# Patient Record
Sex: FEMALE | Race: WHITE | NOT HISPANIC OR LATINO
[De-identification: names, ages, dates, MRNs, and addresses within clinical notes are randomized per-mention and may not be internally consistent; named-entity substitution may affect disease eponyms.]

---

## 2020-03-06 ENCOUNTER — APPOINTMENT (OUTPATIENT)
Dept: OBGYN | Facility: CLINIC | Age: 25
End: 2020-03-06
Payer: MEDICAID

## 2020-03-06 PROCEDURE — 58301 REMOVE INTRAUTERINE DEVICE: CPT

## 2020-05-20 PROBLEM — Z00.00 ENCOUNTER FOR PREVENTIVE HEALTH EXAMINATION: Status: ACTIVE | Noted: 2020-05-20

## 2020-06-29 ENCOUNTER — APPOINTMENT (OUTPATIENT)
Dept: OBGYN | Facility: CLINIC | Age: 25
End: 2020-06-29
Payer: MEDICAID

## 2020-06-29 PROCEDURE — 99385 PREV VISIT NEW AGE 18-39: CPT

## 2020-06-29 PROCEDURE — 36415 COLL VENOUS BLD VENIPUNCTURE: CPT

## 2020-06-29 PROCEDURE — 81025 URINE PREGNANCY TEST: CPT

## 2020-07-13 ENCOUNTER — APPOINTMENT (OUTPATIENT)
Dept: OBGYN | Facility: CLINIC | Age: 25
End: 2020-07-13

## 2020-07-13 ENCOUNTER — APPOINTMENT (OUTPATIENT)
Dept: ANTEPARTUM | Facility: CLINIC | Age: 25
End: 2020-07-13
Payer: MEDICAID

## 2020-07-13 PROCEDURE — 76801 OB US < 14 WKS SINGLE FETUS: CPT

## 2020-08-10 ENCOUNTER — APPOINTMENT (OUTPATIENT)
Dept: OBGYN | Facility: CLINIC | Age: 25
End: 2020-08-10
Payer: MEDICAID

## 2020-08-10 ENCOUNTER — APPOINTMENT (OUTPATIENT)
Dept: ANTEPARTUM | Facility: CLINIC | Age: 25
End: 2020-08-10
Payer: MEDICAID

## 2020-08-10 PROCEDURE — 0502F SUBSEQUENT PRENATAL CARE: CPT

## 2020-08-10 PROCEDURE — 76813 OB US NUCHAL MEAS 1 GEST: CPT

## 2020-09-04 ENCOUNTER — APPOINTMENT (OUTPATIENT)
Dept: OBGYN | Facility: CLINIC | Age: 25
End: 2020-09-04
Payer: MEDICAID

## 2020-09-04 PROCEDURE — 0502F SUBSEQUENT PRENATAL CARE: CPT

## 2020-10-06 ENCOUNTER — APPOINTMENT (OUTPATIENT)
Dept: ANTEPARTUM | Facility: CLINIC | Age: 25
End: 2020-10-06
Payer: MEDICAID

## 2020-10-06 PROCEDURE — 76817 TRANSVAGINAL US OBSTETRIC: CPT

## 2020-10-06 PROCEDURE — 76811 OB US DETAILED SNGL FETUS: CPT

## 2020-11-02 ENCOUNTER — APPOINTMENT (OUTPATIENT)
Dept: OBGYN | Facility: CLINIC | Age: 25
End: 2020-11-02
Payer: MEDICAID

## 2020-11-02 PROCEDURE — 0502F SUBSEQUENT PRENATAL CARE: CPT

## 2020-11-30 ENCOUNTER — APPOINTMENT (OUTPATIENT)
Dept: OBGYN | Facility: CLINIC | Age: 25
End: 2020-11-30
Payer: MEDICAID

## 2020-11-30 PROCEDURE — 0502F SUBSEQUENT PRENATAL CARE: CPT

## 2020-12-09 ENCOUNTER — OUTPATIENT (OUTPATIENT)
Dept: INPATIENT UNIT | Facility: HOSPITAL | Age: 25
LOS: 1 days | Discharge: HOME | End: 2020-12-09
Payer: MEDICAID

## 2020-12-09 VITALS — TEMPERATURE: 98 F

## 2020-12-09 VITALS
TEMPERATURE: 98 F | DIASTOLIC BLOOD PRESSURE: 61 MMHG | SYSTOLIC BLOOD PRESSURE: 96 MMHG | HEART RATE: 82 BPM | RESPIRATION RATE: 16 BRPM

## 2020-12-09 DIAGNOSIS — Z3A.00 WEEKS OF GESTATION OF PREGNANCY NOT SPECIFIED: ICD-10-CM

## 2020-12-09 DIAGNOSIS — O26.899 OTHER SPECIFIED PREGNANCY RELATED CONDITIONS, UNSPECIFIED TRIMESTER: ICD-10-CM

## 2020-12-09 PROCEDURE — 76818 FETAL BIOPHYS PROFILE W/NST: CPT | Mod: 26

## 2020-12-09 PROCEDURE — 99215 OFFICE O/P EST HI 40 MIN: CPT | Mod: 25

## 2020-12-09 NOTE — OB PROVIDER TRIAGE NOTE - NSHPPHYSICALEXAM_GEN_ALL_CORE
Assessment reveals VSS, abdomen soft, NT, gravid.  Placed on EFM and toco at 1522 and removed at 1554- FHT not archived. NST performed in quick look.  Cat 1 FHT, no ctx on toco  Patient reports feeling GFM at this time.     Taken to TR 1 to complete evaluation and for BPP Assessment reveals VSS, abdomen soft, NT, gravid.  Placed on EFM and toco at 1522 and removed at 1554- FHT not archived. NST performed in quick look.  Cat 1 FHT, no ctx on toco  Patient reports feeling GFM at this time.     Taken to TR 1 to complete evaluation and for BPP  ----------------  1602:  TAS-anterior placenta; KARLA-24.2; 8/8 BPP; shira breech presentation Assessment reveals abdomen soft, NT, gravid.  Placed on EFM and toco at 1522 and removed at 1554- FHT not archived. NST performed in quick look.  Cat 1 FHT, no ctx on toco  Patient reports feeling GFM at this time.     Taken to TR 1 to complete evaluation and for BPP  ----------------  1602:  TAS-anterior placenta; KARLA-24.2; 8/8 BPP; shira breech presentation

## 2020-12-09 NOTE — OB PROVIDER TRIAGE NOTE - NSOBPROVIDERNOTE_OBGYN_ALL_OB_FT
26yo  female  @ 29.2 wks SLIUP uncomp PNC here co dec FM since last night  -NST is cat I with accels  -pt reports +FM at this time  -TAS is 8/8 BPP  -pt was dc Dr Gallegos and pt was dc home with instructions to keep her scheduled appt for Monday 12/14

## 2020-12-09 NOTE — OB PROVIDER TRIAGE NOTE - HISTORY OF PRESENT ILLNESS
24yo  @ 29.2 presents with c/o decreased FM since last night. Denies LOF, VB and contractions.  Reports being sick in march, was never covid tested then had positive antibodies.    Denies PMH/PSH    OB H/O 2016 FT  8-6    AP course uncomplicated

## 2020-12-14 ENCOUNTER — TRANSCRIPTION ENCOUNTER (OUTPATIENT)
Age: 25
End: 2020-12-14

## 2020-12-14 ENCOUNTER — APPOINTMENT (OUTPATIENT)
Dept: ANTEPARTUM | Facility: CLINIC | Age: 25
End: 2020-12-14
Payer: MEDICAID

## 2020-12-14 ENCOUNTER — APPOINTMENT (OUTPATIENT)
Dept: OBGYN | Facility: CLINIC | Age: 25
End: 2020-12-14
Payer: MEDICAID

## 2020-12-14 PROCEDURE — 0502F SUBSEQUENT PRENATAL CARE: CPT

## 2020-12-14 PROCEDURE — 99072 ADDL SUPL MATRL&STAF TM PHE: CPT

## 2020-12-14 PROCEDURE — 76819 FETAL BIOPHYS PROFIL W/O NST: CPT

## 2020-12-14 PROCEDURE — 76816 OB US FOLLOW-UP PER FETUS: CPT

## 2020-12-29 ENCOUNTER — APPOINTMENT (OUTPATIENT)
Dept: OBGYN | Facility: CLINIC | Age: 25
End: 2020-12-29
Payer: MEDICAID

## 2020-12-29 PROCEDURE — 0502F SUBSEQUENT PRENATAL CARE: CPT

## 2021-01-11 ENCOUNTER — APPOINTMENT (OUTPATIENT)
Dept: OBGYN | Facility: CLINIC | Age: 26
End: 2021-01-11
Payer: MEDICAID

## 2021-01-11 PROCEDURE — 0502F SUBSEQUENT PRENATAL CARE: CPT

## 2021-01-25 ENCOUNTER — APPOINTMENT (OUTPATIENT)
Dept: OBGYN | Facility: CLINIC | Age: 26
End: 2021-01-25
Payer: MEDICAID

## 2021-01-25 PROCEDURE — 0502F SUBSEQUENT PRENATAL CARE: CPT

## 2021-02-01 ENCOUNTER — APPOINTMENT (OUTPATIENT)
Dept: OBGYN | Facility: CLINIC | Age: 26
End: 2021-02-01

## 2021-02-03 ENCOUNTER — APPOINTMENT (OUTPATIENT)
Dept: OBGYN | Facility: CLINIC | Age: 26
End: 2021-02-03
Payer: MEDICAID

## 2021-02-03 PROCEDURE — 0502F SUBSEQUENT PRENATAL CARE: CPT

## 2021-02-09 ENCOUNTER — APPOINTMENT (OUTPATIENT)
Dept: ANTEPARTUM | Facility: CLINIC | Age: 26
End: 2021-02-09
Payer: MEDICAID

## 2021-02-09 PROCEDURE — 76816 OB US FOLLOW-UP PER FETUS: CPT

## 2021-02-09 PROCEDURE — 99072 ADDL SUPL MATRL&STAF TM PHE: CPT

## 2021-02-09 PROCEDURE — 76819 FETAL BIOPHYS PROFIL W/O NST: CPT

## 2021-02-17 ENCOUNTER — APPOINTMENT (OUTPATIENT)
Dept: OBGYN | Facility: CLINIC | Age: 26
End: 2021-02-17
Payer: MEDICAID

## 2021-02-17 PROCEDURE — 0502F SUBSEQUENT PRENATAL CARE: CPT

## 2021-02-22 ENCOUNTER — APPOINTMENT (OUTPATIENT)
Dept: ANTEPARTUM | Facility: CLINIC | Age: 26
End: 2021-02-22
Payer: MEDICAID

## 2021-02-22 PROCEDURE — 76816 OB US FOLLOW-UP PER FETUS: CPT

## 2021-02-22 PROCEDURE — 99072 ADDL SUPL MATRL&STAF TM PHE: CPT

## 2021-02-22 PROCEDURE — 76818 FETAL BIOPHYS PROFILE W/NST: CPT

## 2021-02-25 ENCOUNTER — APPOINTMENT (OUTPATIENT)
Dept: ANTEPARTUM | Facility: CLINIC | Age: 26
End: 2021-02-25
Payer: MEDICAID

## 2021-02-25 PROCEDURE — 0502F SUBSEQUENT PRENATAL CARE: CPT

## 2021-02-26 DIAGNOSIS — Z01.818 ENCOUNTER FOR OTHER PREPROCEDURAL EXAMINATION: ICD-10-CM

## 2021-02-27 ENCOUNTER — APPOINTMENT (OUTPATIENT)
Dept: DISASTER EMERGENCY | Facility: CLINIC | Age: 26
End: 2021-02-27

## 2021-02-28 ENCOUNTER — TRANSCRIPTION ENCOUNTER (OUTPATIENT)
Age: 26
End: 2021-02-28

## 2021-02-28 ENCOUNTER — INPATIENT (INPATIENT)
Facility: HOSPITAL | Age: 26
LOS: 1 days | Discharge: HOME | End: 2021-03-02
Attending: OBSTETRICS & GYNECOLOGY | Admitting: OBSTETRICS & GYNECOLOGY
Payer: MEDICAID

## 2021-02-28 VITALS — SYSTOLIC BLOOD PRESSURE: 124 MMHG | DIASTOLIC BLOOD PRESSURE: 75 MMHG | HEART RATE: 141 BPM

## 2021-02-28 DIAGNOSIS — O26.899 OTHER SPECIFIED PREGNANCY RELATED CONDITIONS, UNSPECIFIED TRIMESTER: ICD-10-CM

## 2021-02-28 DIAGNOSIS — Z3A.00 WEEKS OF GESTATION OF PREGNANCY NOT SPECIFIED: ICD-10-CM

## 2021-02-28 LAB
BASOPHILS # BLD AUTO: 0.04 K/UL — SIGNIFICANT CHANGE UP (ref 0–0.2)
BASOPHILS NFR BLD AUTO: 0.2 % — SIGNIFICANT CHANGE UP (ref 0–2)
EOSINOPHIL # BLD AUTO: 0.05 K/UL — SIGNIFICANT CHANGE UP (ref 0–0.5)
EOSINOPHIL NFR BLD AUTO: 0.3 % — SIGNIFICANT CHANGE UP (ref 0–6)
HCT VFR BLD CALC: 37.3 % — SIGNIFICANT CHANGE UP (ref 34.5–45)
HGB BLD-MCNC: 11.5 G/DL — SIGNIFICANT CHANGE UP (ref 11.5–15.5)
IANC: 15.02 K/UL — HIGH (ref 1.5–8.5)
IMM GRANULOCYTES NFR BLD AUTO: 0.7 % — SIGNIFICANT CHANGE UP (ref 0–1.5)
LYMPHOCYTES # BLD AUTO: 12 % — LOW (ref 13–44)
LYMPHOCYTES # BLD AUTO: 2.27 K/UL — SIGNIFICANT CHANGE UP (ref 1–3.3)
MCHC RBC-ENTMCNC: 28.1 PG — SIGNIFICANT CHANGE UP (ref 27–34)
MCHC RBC-ENTMCNC: 30.8 GM/DL — LOW (ref 32–36)
MCV RBC AUTO: 91.2 FL — SIGNIFICANT CHANGE UP (ref 80–100)
MONOCYTES # BLD AUTO: 1.38 K/UL — HIGH (ref 0–0.9)
MONOCYTES NFR BLD AUTO: 7.3 % — SIGNIFICANT CHANGE UP (ref 2–14)
NEUTROPHILS # BLD AUTO: 15.02 K/UL — HIGH (ref 1.8–7.4)
NEUTROPHILS NFR BLD AUTO: 79.5 % — HIGH (ref 43–77)
NRBC # BLD: 0 /100 WBCS — SIGNIFICANT CHANGE UP
NRBC # FLD: 0 K/UL — SIGNIFICANT CHANGE UP
PLATELET # BLD AUTO: 319 K/UL — SIGNIFICANT CHANGE UP (ref 150–400)
RBC # BLD: 4.09 M/UL — SIGNIFICANT CHANGE UP (ref 3.8–5.2)
RBC # FLD: 12.8 % — SIGNIFICANT CHANGE UP (ref 10.3–14.5)
SARS-COV-2 N GENE NPH QL NAA+PROBE: NOT DETECTED
WBC # BLD: 18.89 K/UL — HIGH (ref 3.8–10.5)
WBC # FLD AUTO: 18.89 K/UL — HIGH (ref 3.8–10.5)

## 2021-02-28 RX ORDER — OXYTOCIN 10 UNIT/ML
333.33 VIAL (ML) INJECTION
Qty: 20 | Refills: 0 | Status: DISCONTINUED | OUTPATIENT
Start: 2021-02-28 | End: 2021-03-01

## 2021-02-28 RX ORDER — SODIUM CHLORIDE 9 MG/ML
1000 INJECTION, SOLUTION INTRAVENOUS ONCE
Refills: 0 | Status: DISCONTINUED | OUTPATIENT
Start: 2021-02-28 | End: 2021-03-01

## 2021-02-28 RX ORDER — SODIUM CHLORIDE 9 MG/ML
1000 INJECTION, SOLUTION INTRAVENOUS
Refills: 0 | Status: DISCONTINUED | OUTPATIENT
Start: 2021-02-28 | End: 2021-03-01

## 2021-02-28 NOTE — OB PROVIDER TRIAGE NOTE - NSHPPHYSICALEXAM_GEN_ALL_CORE
LS clear bilaterally  Abd soft gravid NT  FHT: baseline 155, + accels, moderate variability  toco: q 4-5 min  Vital Signs Last 24 Hrs  T(C): 37.4 (28 Feb 2021 21:24), Max: 37.4 (28 Feb 2021 21:24)  T(F): 99.3 (28 Feb 2021 21:24), Max: 99.3 (28 Feb 2021 21:24)  HR: 117 (28 Feb 2021 21:53) (117 - 145)  BP: 128/70 (28 Feb 2021 21:37) (124/75 - 128/70)  BP(mean): --  RR: 18 (28 Feb 2021 21:24) (18 - 18)  SpO2: 99% (28 Feb 2021 21:53) (94% - 99%)

## 2021-02-28 NOTE — OB PROVIDER H&P - PROBLEM SELECTOR PLAN 1
d/w Dr Lim admit for labor at 40.6 wks  epidural for pain control  IV fluid for fetal tachycardia and maternal tachycardia  prenatals reviewed  covid swab negative from 2/27/21 results in HIE

## 2021-02-28 NOTE — OB PROVIDER TRIAGE NOTE - NS_FETALHEARTHEAR_OBGYN_ALL_OB
Biopsy Wound Care:    Type: Shave / Punch / Excisional    1.  Wash your hands with soap and water.    2.  Cleanse the biopsy site with gentle soap and water    3.   Thoroughly dry the area and apply a small amount of ointment such as Vaseline or white petrolatum.  Antibiotic ointments such as polysporin, bacitracin, and triple antibiotic ointment are not necessary and should be avoided.      4.  Keep wound covered with a small dressing or band-aid until the wound has healed.  Studies have shown that that wounds heal better when covered with ointment and a dressing than when they are left open to air and form a scab or crust.  Ideally, a crust should not form.  However if a crust has formed, you can soak it off with a wet warm cloth.  Scabs should not be forcefully removed.     5.  Change the dressing daily as needed until healed.    6.  If your biopsy site is in an area that you shave, please take care to avoid letting the razor come near the stitches or healing wound.    *It is normal to have mild bleeding from biopsy sites after the procedure, especially if you are on any blood thinners such as aspirin, plavix, coumadin, vitamin E, or fish oil.  If you notice significant bleeding from any biopsy site, apply clean gauze to the area and hold firm pressure for 15 minutes without removing pressure.  You may also apply a cold compress on top of the gauze, but maintain pressure on the wound.  If the biopsy site continues to ooze, again hold firm pressure to the area for 15 minutes.  At this point, if there is significant bleeding that soaks clean gauze pads, and the bleeding has not resolved with consistent firm pressure, you should call our office to be seen.  If we are not in the office, you may need to be evaluated by urgent care.       Yes

## 2021-02-28 NOTE — OB PROVIDER H&P - HISTORY OF PRESENT ILLNESS
26 yo  @ 40.6 wks c/o contractions every 5 min since 7pm. denies vb or lof, reports +GFM. AP course uncomplicated thus far. denies fever chills ha n/v/d dysuria epigastric pain new swelling vision changes cough cp sob denies travel illness or sick contacts. Scheduled IOL 3/2 covid swab sent 21. pt reports feeling sinus pressure in face and jaw saw md today and started on Augmentin for sinus infection. pt reports baseline heart rate is in elevated in one teens.    GBS: negative  meds: PNV Amoxicillin  All: denies  PMH: sinus infection  PSH: denies  gyn hx: denies  ob hx:  2016 @ 37 wks 8#6 uncomplicated

## 2021-02-28 NOTE — OB PROVIDER TRIAGE NOTE - ABORTIONS, OB PROFILE
0
Inability to Tolerate Liquids or Foods/Increased Irritability or Sluggishness/Persistent Nausea and Vomiting

## 2021-02-28 NOTE — OB PROVIDER TRIAGE NOTE - NSOBPROVIDERNOTE_OBGYN_ALL_OB_FT
26 yo  @ 40.6 wks c/o contractions every 5 min since 7pm. denies vb or lof, reports +GFM. AP course uncomplicated thus far. denies fever chills ha n/v/d dysuria epigastric pain new swelling vision changes cough cp sob denies travel illness or sick contacts. Scheduled IOL 3/2 covid swab sent 21. pt reports feeling sinus pressure in face and jaw saw md today and started on Augmentin for sinus infection. pt reports baseline heart rate is in elevated in one teens.    GBS: negative  meds: PNV Amoxicillin  All: denies  PMH: sinus infection  PSH: denies  gyn hx: denies  ob hx:  2016 @ 37 wks 8#6 uncomplicated    d/w Dr Lim admit for labor at 40.6 wks  epidural for pain control  IV fluid for fetal tachycardia and maternal tachycardia  prenatals reviewed  covid swab negative from 21 results in HIE

## 2021-02-28 NOTE — OB PROVIDER TRIAGE NOTE - HISTORY OF PRESENT ILLNESS
24 yo  @ 40.6 wks c/o contractions every 5 min since 7pm. denies vb or lof, reports +GFM. AP course uncomplicated thus far. denies fever chills ha n/v/d dysuria epigastric pain new swelling vision changes cough cp sob denies travel illness or sick contacts. Scheduled IOL 3/2 covid swab sent 21. pt reports feeling sinus pressure in face and jaw saw md today and started on Augmentin for sinus infection. pt reports baseline heart rate is in elevated in one teens.    GBS: negative  meds: PNV Amoxicillin  All: denies  PMH: sinus infection  PSH: denies  gyn hx: denies  ob hx:  2016 @ 37 wks 8#6 uncomplicated

## 2021-02-28 NOTE — OB PROVIDER H&P - ASSESSMENT
24 yo  @ 40.6 wks c/o contractions every 5 min since 7pm. denies vb or lof, reports +GFM. AP course uncomplicated thus far. denies fever chills ha n/v/d dysuria epigastric pain new swelling vision changes cough cp sob denies travel illness or sick contacts. Scheduled IOL 3/2 covid swab sent 21. pt reports feeling sinus pressure in face and jaw saw md today and started on Augmentin for sinus infection. pt reports baseline heart rate is in elevated in one teens. Last OB visit 1 cm, EFW 9#5, 4224g      d/w Dr Lim admit for labor at 40.6 wks  epidural for pain control  IV fluid for fetal tachycardia and maternal tachycardia  prenatals reviewed  covid swab negative from 21 results in HIE 24 yo  @ 40.6 wks c/o contractions every 5 min since 7pm. denies vb or lof, reports +GFM. AP course uncomplicated thus far. denies fever chills ha n/v/d dysuria epigastric pain new swelling vision changes cough cp sob denies travel illness or sick contacts. Scheduled IOL 3/2 covid swab sent 21. pt reports feeling sinus pressure in face and jaw saw md today and started on Augmentin for sinus infection. pt reports baseline heart rate is in elevated in one teens. Last OB visit 1 cm, EFW 9#5, 4224g      d/w Dr Lim admit for labor at 40.6 wks  epidural for pain control  IV fluid for fetal tachycardia and maternal tachycardia  prenatals reviewed  covid swab negative from 21 results in HIE  PRBC on hold x 2 units 2/2 LGA EFW 9#5

## 2021-03-01 ENCOUNTER — APPOINTMENT (OUTPATIENT)
Dept: ANTEPARTUM | Facility: CLINIC | Age: 26
End: 2021-03-01

## 2021-03-01 ENCOUNTER — APPOINTMENT (OUTPATIENT)
Dept: OBGYN | Facility: CLINIC | Age: 26
End: 2021-03-01

## 2021-03-01 LAB
SARS-COV-2 IGG SERPL QL IA: POSITIVE
SARS-COV-2 IGM SERPL IA-ACNC: 1.44 INDEX — HIGH
T PALLIDUM AB TITR SER: NEGATIVE — SIGNIFICANT CHANGE UP

## 2021-03-01 PROCEDURE — 59400 OBSTETRICAL CARE: CPT | Mod: U9

## 2021-03-01 RX ORDER — AMPICILLIN TRIHYDRATE 250 MG
2 CAPSULE ORAL EVERY 6 HOURS
Refills: 0 | Status: DISCONTINUED | OUTPATIENT
Start: 2021-03-01 | End: 2021-03-01

## 2021-03-01 RX ORDER — ERTAPENEM SODIUM 1 G/1
1000 INJECTION, POWDER, LYOPHILIZED, FOR SOLUTION INTRAMUSCULAR; INTRAVENOUS EVERY 24 HOURS
Refills: 0 | Status: DISCONTINUED | OUTPATIENT
Start: 2021-03-01 | End: 2021-03-02

## 2021-03-01 RX ORDER — GENTAMICIN SULFATE 40 MG/ML
350 VIAL (ML) INJECTION ONCE
Refills: 0 | Status: DISCONTINUED | OUTPATIENT
Start: 2021-03-01 | End: 2021-03-01

## 2021-03-01 RX ORDER — AER TRAVELER 0.5 G/1
1 SOLUTION RECTAL; TOPICAL EVERY 4 HOURS
Refills: 0 | Status: DISCONTINUED | OUTPATIENT
Start: 2021-03-01 | End: 2021-03-02

## 2021-03-01 RX ORDER — OXYTOCIN 10 UNIT/ML
333.33 VIAL (ML) INJECTION
Qty: 20 | Refills: 0 | Status: DISCONTINUED | OUTPATIENT
Start: 2021-03-01 | End: 2021-03-02

## 2021-03-01 RX ORDER — HYDROCORTISONE 1 %
1 OINTMENT (GRAM) TOPICAL EVERY 6 HOURS
Refills: 0 | Status: DISCONTINUED | OUTPATIENT
Start: 2021-03-01 | End: 2021-03-02

## 2021-03-01 RX ORDER — BENZOCAINE 10 %
1 GEL (GRAM) MUCOUS MEMBRANE EVERY 6 HOURS
Refills: 0 | Status: DISCONTINUED | OUTPATIENT
Start: 2021-03-01 | End: 2021-03-02

## 2021-03-01 RX ORDER — OXYCODONE HYDROCHLORIDE 5 MG/1
5 TABLET ORAL
Refills: 0 | Status: DISCONTINUED | OUTPATIENT
Start: 2021-03-01 | End: 2021-03-02

## 2021-03-01 RX ORDER — IBUPROFEN 200 MG
600 TABLET ORAL EVERY 6 HOURS
Refills: 0 | Status: COMPLETED | OUTPATIENT
Start: 2021-03-01 | End: 2022-01-28

## 2021-03-01 RX ORDER — ACETAMINOPHEN 500 MG
975 TABLET ORAL
Refills: 0 | Status: DISCONTINUED | OUTPATIENT
Start: 2021-03-01 | End: 2021-03-02

## 2021-03-01 RX ORDER — CITRIC ACID/SODIUM CITRATE 300-500 MG
30 SOLUTION, ORAL ORAL ONCE
Refills: 0 | Status: COMPLETED | OUTPATIENT
Start: 2021-03-01 | End: 2021-03-01

## 2021-03-01 RX ORDER — PRAMOXINE HYDROCHLORIDE 150 MG/15G
1 AEROSOL, FOAM RECTAL EVERY 4 HOURS
Refills: 0 | Status: DISCONTINUED | OUTPATIENT
Start: 2021-03-01 | End: 2021-03-02

## 2021-03-01 RX ORDER — KETOROLAC TROMETHAMINE 30 MG/ML
30 SYRINGE (ML) INJECTION ONCE
Refills: 0 | Status: DISCONTINUED | OUTPATIENT
Start: 2021-03-01 | End: 2021-03-01

## 2021-03-01 RX ORDER — DIPHENHYDRAMINE HCL 50 MG
25 CAPSULE ORAL EVERY 6 HOURS
Refills: 0 | Status: DISCONTINUED | OUTPATIENT
Start: 2021-03-01 | End: 2021-03-02

## 2021-03-01 RX ORDER — SIMETHICONE 80 MG/1
80 TABLET, CHEWABLE ORAL EVERY 4 HOURS
Refills: 0 | Status: DISCONTINUED | OUTPATIENT
Start: 2021-03-01 | End: 2021-03-02

## 2021-03-01 RX ORDER — OXYCODONE HYDROCHLORIDE 5 MG/1
5 TABLET ORAL ONCE
Refills: 0 | Status: DISCONTINUED | OUTPATIENT
Start: 2021-03-01 | End: 2021-03-02

## 2021-03-01 RX ORDER — SODIUM CHLORIDE 9 MG/ML
3 INJECTION INTRAMUSCULAR; INTRAVENOUS; SUBCUTANEOUS EVERY 8 HOURS
Refills: 0 | Status: DISCONTINUED | OUTPATIENT
Start: 2021-03-01 | End: 2021-03-02

## 2021-03-01 RX ORDER — TETANUS TOXOID, REDUCED DIPHTHERIA TOXOID AND ACELLULAR PERTUSSIS VACCINE, ADSORBED 5; 2.5; 8; 8; 2.5 [IU]/.5ML; [IU]/.5ML; UG/.5ML; UG/.5ML; UG/.5ML
0.5 SUSPENSION INTRAMUSCULAR ONCE
Refills: 0 | Status: DISCONTINUED | OUTPATIENT
Start: 2021-03-01 | End: 2021-03-02

## 2021-03-01 RX ORDER — MAGNESIUM HYDROXIDE 400 MG/1
30 TABLET, CHEWABLE ORAL
Refills: 0 | Status: DISCONTINUED | OUTPATIENT
Start: 2021-03-01 | End: 2021-03-02

## 2021-03-01 RX ORDER — LANOLIN
1 OINTMENT (GRAM) TOPICAL EVERY 6 HOURS
Refills: 0 | Status: DISCONTINUED | OUTPATIENT
Start: 2021-03-01 | End: 2021-03-02

## 2021-03-01 RX ORDER — ACETAMINOPHEN 500 MG
1000 TABLET ORAL ONCE
Refills: 0 | Status: COMPLETED | OUTPATIENT
Start: 2021-03-01 | End: 2021-03-01

## 2021-03-01 RX ORDER — SODIUM CHLORIDE 9 MG/ML
500 INJECTION, SOLUTION INTRAVENOUS ONCE
Refills: 0 | Status: COMPLETED | OUTPATIENT
Start: 2021-03-01 | End: 2021-03-01

## 2021-03-01 RX ORDER — IBUPROFEN 200 MG
600 TABLET ORAL EVERY 6 HOURS
Refills: 0 | Status: DISCONTINUED | OUTPATIENT
Start: 2021-03-01 | End: 2021-03-02

## 2021-03-01 RX ORDER — DIBUCAINE 1 %
1 OINTMENT (GRAM) RECTAL EVERY 6 HOURS
Refills: 0 | Status: DISCONTINUED | OUTPATIENT
Start: 2021-03-01 | End: 2021-03-02

## 2021-03-01 RX ADMIN — Medication 1000 MILLIUNIT(S)/MIN: at 04:30

## 2021-03-01 RX ADMIN — Medication 30 MILLIGRAM(S): at 05:35

## 2021-03-01 RX ADMIN — Medication 216 GRAM(S): at 02:50

## 2021-03-01 RX ADMIN — SODIUM CHLORIDE 3 MILLILITER(S): 9 INJECTION INTRAMUSCULAR; INTRAVENOUS; SUBCUTANEOUS at 14:00

## 2021-03-01 RX ADMIN — Medication 600 MILLIGRAM(S): at 14:00

## 2021-03-01 RX ADMIN — SODIUM CHLORIDE 3 MILLILITER(S): 9 INJECTION INTRAMUSCULAR; INTRAVENOUS; SUBCUTANEOUS at 22:00

## 2021-03-01 RX ADMIN — Medication 30 MILLILITER(S): at 08:36

## 2021-03-01 RX ADMIN — Medication 600 MILLIGRAM(S): at 05:38

## 2021-03-01 RX ADMIN — SODIUM CHLORIDE 1000 MILLILITER(S): 9 INJECTION, SOLUTION INTRAVENOUS at 03:30

## 2021-03-01 RX ADMIN — Medication 400 MILLIGRAM(S): at 03:10

## 2021-03-01 NOTE — OB NEONATOLOGY/PEDIATRICIAN DELIVERY SUMMARY - NSPEDSNEONOTESA_OBGYN_ALL_OB_FT
Baby is a 41 wk GA f born to a 26 y/o  mother via . Maternal history uncomplicated. Prenatal history  uncomplicated. peds called for cat 2 tracing and possible shoulder. Maternal BT B+ .PNL neg, NR, and immune. GBS neg on . AROM at 2323 on , clear fluids. Baby born with poor tone, respiration and color. WDSS. received a 10 seconds of PPV and 1 minute of life, and about 8 minutes of CPAP. Afterwards babies color, tone and breathign improved. Apgars 6/8. EOS 0.66. Maternal temp 38.1. Mom plans to bottle and breastfeed, would like hepB. BW 4080 AGA.

## 2021-03-01 NOTE — LACTATION INITIAL EVALUATION - LACTATION INTERVENTIONS
reviewed with  mother breastfeeding section  of  postpartum  book./initiate skin to skin/initiate hand expression routine/initiate/review early breastfeeding management guidelines/initiate/review breast massage/compression

## 2021-03-01 NOTE — OB PROVIDER LABOR PROGRESS NOTE - ASSESSMENT
continue expectant management  amp, gent, tylenol for intrapartum fever  anesthesia effective  IVF bolus to be given    Discussed with attending physician, Dr. Lim.    Salima Beasley MD PGY4

## 2021-03-01 NOTE — OB PROVIDER LABOR PROGRESS NOTE - NS_SUBJECTIVE/OBJECTIVE_OBGYN_ALL_OB_FT
PGY4 Progress Note    Pt reexamined. SVE as below. Pt is comfortable. Pt denies any other concerns.    Objective  T(C): 38.1 (03-01-21 @ 02:37), Max: 38.1 (03-01-21 @ 02:37)  HR: 109 (03-01-21 @ 03:23) (96 - 145)  BP: 97/66 (03-01-21 @ 03:23) (97/66 - 154/74)  RR: 14 (02-28-21 @ 23:39) (14 - 18)  SpO2: 94% (03-01-21 @ 03:18) (85% - 100%)

## 2021-03-01 NOTE — OB PROVIDER DELIVERY SUMMARY - NSPROVIDERDELIVERYNOTE_OBGYN_ALL_OB_FT
viable female. TIght nuchal cord x 1 clamped and cut.  Infant handed to pediatrician. RML repaired with 2-0 chromic.  Placenta delivered spontaneously and intact - marginal cord insertion. Good hemostasis.

## 2021-03-01 NOTE — OB RN DELIVERY SUMMARY - NS_SEPSISRSKCALC_OBGYN_ALL_OB_FT
EOS calculated successfully. EOS Risk Factor: 0.5/1000 live births ([facility] national incidence); GA=41w;Temp=100.58; ROM=4.583; GBS='Negative'; Antibiotics='No antibiotics or any antibiotics < 2 hrs prior to birth'

## 2021-03-01 NOTE — DISCHARGE NOTE OB - CARE PROVIDER_API CALL
Rafa Juarez)  MaternalFetal Medicine; Obstetrics and Gynecology  [address] [place] [address]  Phone: [phone]  Fax: [phone]  Follow Up Time:

## 2021-03-01 NOTE — DISCHARGE NOTE OB - CARE PLAN
26-Jun-2020 02:02
Principal Discharge DX:	Vaginal delivery  Goal:	recovery  Assessment and plan of treatment:	with Dr. Juarez in 6 weeks

## 2021-03-01 NOTE — LACTATION INITIAL EVALUATION - INTERVENTION OUTCOME
nbn demonstrated  deep latch and  performed  with sucking and swallowing  noted . reviewed  risks  of  formula  feeding  ./verbalizes understanding

## 2021-03-01 NOTE — DISCHARGE NOTE OB - MEDICATION SUMMARY - MEDICATIONS TO TAKE
I will START or STAY ON the medications listed below when I get home from the hospital:    ibuprofen 600 mg oral tablet  -- 1 tab(s) by mouth every 6 hours, As Needed  -- Indication: For pain    acetaminophen 325 mg oral tablet  -- 3 tab(s) by mouth , As Needed  -- Indication: For pain    Prenatal Multivitamins with Folic Acid 1 mg oral tablet  -- 1 tab(s) by mouth once a day  -- Indication: For Vitamin

## 2021-03-01 NOTE — DISCHARGE NOTE OB - PATIENT PORTAL LINK FT
You can access the FollowMyHealth Patient Portal offered by [facility] by registering at the following website: [email or web address]. By joining [employer]’s FollowMyHealth portal, you will also be able to view your health information using other applications (apps) compatible with our system.

## 2021-03-02 VITALS
SYSTOLIC BLOOD PRESSURE: 116 MMHG | RESPIRATION RATE: 18 BRPM | TEMPERATURE: 98 F | OXYGEN SATURATION: 98 % | DIASTOLIC BLOOD PRESSURE: 69 MMHG | HEART RATE: 97 BPM

## 2021-03-02 RX ORDER — IBUPROFEN 200 MG
1 TABLET ORAL
Qty: 0 | Refills: 0 | DISCHARGE
Start: 2021-03-02

## 2021-03-02 RX ORDER — ACETAMINOPHEN 500 MG
3 TABLET ORAL
Qty: 0 | Refills: 0 | DISCHARGE
Start: 2021-03-02

## 2021-03-02 RX ADMIN — Medication 600 MILLIGRAM(S): at 06:17

## 2021-03-02 RX ADMIN — ERTAPENEM SODIUM 100 MILLIGRAM(S): 1 INJECTION, POWDER, LYOPHILIZED, FOR SOLUTION INTRAMUSCULAR; INTRAVENOUS at 05:34

## 2021-03-02 RX ADMIN — SODIUM CHLORIDE 3 MILLILITER(S): 9 INJECTION INTRAMUSCULAR; INTRAVENOUS; SUBCUTANEOUS at 06:12

## 2021-03-02 NOTE — PROGRESS NOTE ADULT - SUBJECTIVE AND OBJECTIVE BOX
Subjective  Pain: well controlled  Complaints: none  Milestones: Alert and orientedx3. Out of bed ambulating. Voiding/Due to void. Tolerating a regular diet.  Infant feeding:     breast                            Feeding related issues or concerns: none  Objective   T(C): 36.8 (03-02-21 @ 05:51), Max: 37.2 (03-01-21 @ 14:00)  HR: 81 (03-02-21 @ 05:51) (73 - 103)  BP: 113/80 (03-02-21 @ 05:51) (102/59 - 113/80)  RR: 18 (03-02-21 @ 05:51) (17 - 18)  SpO2: 99% (03-02-21 @ 05:51) (98% - 99%)  Wt(kg): --      Assessment    26 y/o G 2 P2  .Day #1  postpartum. chorio s/p amp and on IV Invanz, afebrile now  Assisted delivery (vacuum, forceps):  Indication for assisted delivery:  Past medical history significant for none  Current Issues: none  Breasts:soft  Abdomen: Soft, nontender, nondistended.  Vagina: Lochia moderate  Perineum:  ML episiotomy  Laceration/episiotomy site: clean, no swelling  Lower extremities: No edema noted bilaterally of lower extremities. Nontender calves.  Negative Donta's sign. Positive pedal pulses.  Other relevant physical exam findings;none      Plan  Plan: Increase ambulation, analgesia PRN and pain medication protocal standing oxycodone, ibuprofen and acetaminophen.  Diet: Continue regular diet  Continue routine postpartum care. 
S:  Pt seen and examined for cervical change   pt is comfortable with epidural    O:   T(C): 37.1 (23:39)  HR: 132 (23:58)  BP: 115/67 (23:51)  RR: 14 (23:39)  SpO2: 98% (23:58)  SVE: 7//-2 AROM clear fluid          A/P: 25y admitted for labor  -Continue current management  -Anticipate   -    L MD Carina

## 2021-03-03 LAB
CULTURE RESULTS: SIGNIFICANT CHANGE UP
SPECIMEN SOURCE: SIGNIFICANT CHANGE UP

## 2021-03-05 LAB
CULTURE RESULTS: SIGNIFICANT CHANGE UP
SPECIMEN SOURCE: SIGNIFICANT CHANGE UP

## 2021-04-12 ENCOUNTER — APPOINTMENT (OUTPATIENT)
Dept: OBGYN | Facility: CLINIC | Age: 26
End: 2021-04-12
Payer: MEDICAID

## 2021-04-12 PROCEDURE — 0503F POSTPARTUM CARE VISIT: CPT

## 2021-10-25 RX ORDER — ETONOGESTREL AND ETHINYL ESTRADIOL 11.7; 2.7 MG/1; MG/1
0.12-0.015 INSERT, EXTENDED RELEASE VAGINAL
Qty: 1 | Refills: 6 | Status: ACTIVE | COMMUNITY
Start: 2021-10-25 | End: 1900-01-01

## 2022-09-16 ENCOUNTER — APPOINTMENT (OUTPATIENT)
Dept: OBGYN | Facility: CLINIC | Age: 27
End: 2022-09-16

## 2022-09-16 VITALS — SYSTOLIC BLOOD PRESSURE: 107 MMHG | DIASTOLIC BLOOD PRESSURE: 72 MMHG | WEIGHT: 140 LBS

## 2022-09-16 DIAGNOSIS — Z78.9 OTHER SPECIFIED HEALTH STATUS: ICD-10-CM

## 2022-09-16 DIAGNOSIS — Z01.419 ENCOUNTER FOR GYNECOLOGICAL EXAMINATION (GENERAL) (ROUTINE) W/OUT ABNORMAL FINDINGS: ICD-10-CM

## 2022-09-16 DIAGNOSIS — Z82.49 FAMILY HISTORY OF ISCHEMIC HEART DISEASE AND OTHER DISEASES OF THE CIRCULATORY SYSTEM: ICD-10-CM

## 2022-09-16 DIAGNOSIS — Z83.3 FAMILY HISTORY OF DIABETES MELLITUS: ICD-10-CM

## 2022-09-16 PROCEDURE — 99395 PREV VISIT EST AGE 18-39: CPT

## 2022-09-16 RX ORDER — ETONOGESTREL AND ETHINYL ESTRADIOL 11.7; 2.7 MG/1; MG/1
0.12-0.015 INSERT, EXTENDED RELEASE VAGINAL
Qty: 3 | Refills: 3 | Status: ACTIVE | COMMUNITY
Start: 2022-09-16 | End: 1900-01-01

## 2022-09-16 RX ORDER — NORETHINDRONE ACETATE 5 MG/1
5 TABLET ORAL DAILY
Qty: 30 | Refills: 0 | Status: DISCONTINUED | COMMUNITY
Start: 2022-07-18 | End: 2022-09-16

## 2022-09-16 NOTE — HISTORY OF PRESENT ILLNESS
[Regular Cycle Intervals] : periods have been regular [Currently Active] : currently active [Men] : men [Vaginal] : vaginal [No] : No [Patient refuses STI testing] : Patient refuses STI testing [LMPDate] : 8/18/22 [PGHxTotal] : 2 [[facility] : [facility] [PGHxPremature] : 0 [PGHxAbortions] : 0 [[facility] : [facility] [PGHxABInduced] : 0 [PGHxABSpont] : 0 [PGHxEctopic] : 0 [PGHxMultBirths] : 0 [FreeTextEntry1] : 8/18/22 [FreeTextEntry3] : nuvaring no

## 2022-09-16 NOTE — PHYSICAL EXAM
[Chaperone Present] : A chaperone was present in the examining room during all aspects of the physical examination [FreeTextEntry1] : Margaret [Appropriately responsive] : appropriately responsive [Alert] : alert [No Acute Distress] : no acute distress [Soft] : soft [Non-tender] : non-tender [Non-distended] : non-distended [No HSM] : No HSM [No Lesions] : no lesions [No Mass] : no mass [Oriented x3] : oriented x3 [Examination Of The Breasts] : a normal appearance [No Masses] : no breast masses were palpable [Labia Majora] : normal [Labia Minora] : normal [Normal] : normal [Uterine Adnexae] : normal I, Brian Peña, performed the initial face to face bedside interview with this patient regarding history of present illness, review of symptoms and relevant past medical, social and family history.  I completed an independent physical examination.  I was the initial provider who evaluated this patient. I have signed out the follow up of any pending tests (i.e. labs, radiological studies) to the ACP.  I have communicated the patient’s plan of care and disposition with the ACP.

## 2022-09-16 NOTE — PLAN
[FreeTextEntry1] : 26 y/o female presenting for annual exam:\par -f/u pap and GC/CT\par -Contraception: nuvaring, rx sent to pharmacy\par -f/u PRN\par

## 2022-09-19 LAB
C TRACH RRNA SPEC QL NAA+PROBE: NOT DETECTED
HPV HIGH+LOW RISK DNA PNL CVX: NOT DETECTED
N GONORRHOEA RRNA SPEC QL NAA+PROBE: NOT DETECTED
SOURCE AMPLIFICATION: NORMAL

## 2022-09-23 LAB — CYTOLOGY CVX/VAG DOC THIN PREP: NORMAL

## 2022-10-06 NOTE — OB RN TRIAGE NOTE - NS_TRIAGEINITIALRNASSESSMENT_OBGYN_ALL_OB_FT
"----- Message from Shanna Olsen sent at 10/6/2022  3:08 PM CDT -----  Regarding: speak with office URGENT  Contact: valente  Consult/Advisory:           Name Of Caller: valente with medicare    Contact Preference?:[phone]           Does patient feel the need to be seen today? no           What is the nature of the call?: calling to speak with office about hearing for pt.            Additional Notes:  "Thank you for all that you do for our patients'"     " lrao Topical Ketoconazole Counseling: Patient counseled that this medication may cause skin irritation or allergic reactions.  In the event of skin irritation, the patient was advised to reduce the amount of the drug applied or use it less frequently.   The patient verbalized understanding of the proper use and possible adverse effects of ketoconazole.  All of the patient's questions and concerns were addressed.

## 2023-11-02 ENCOUNTER — RX RENEWAL (OUTPATIENT)
Age: 28
End: 2023-11-02

## 2024-01-17 NOTE — OB RN TRIAGE NOTE - NSOBDISCHARGEVS_OBGYN_ALL_OB
This document is complete and the patient is ready for discharge. Confirmed that patient received an additional set of vital signs prior to discharge.

## 2025-06-11 NOTE — OB PROVIDER TRIAGE NOTE - NSCHILDCOND1_OBGYN_ALL_OB
Problem: Pain  Goal: Verbalizes/displays adequate comfort level or baseline comfort level  6/10/2025 2003 by Zach Zacarias RN  Outcome: Progressing  6/10/2025 1310 by Farhana Vazquez RN  Outcome: Progressing  6/10/2025 1310 by Farhana Vazquez RN  Outcome: Progressing     Problem: Safety - Adult  Goal: Free from fall injury  6/10/2025 2003 by Zach Zacarias RN  Outcome: Progressing  6/10/2025 1310 by Farhana Vazquez RN  Outcome: Progressing     Problem: Discharge Planning  Goal: Discharge to home or other facility with appropriate resources  Outcome: Progressing      Living